# Patient Record
Sex: MALE | Race: WHITE | Employment: FULL TIME | URBAN - METROPOLITAN AREA
[De-identification: names, ages, dates, MRNs, and addresses within clinical notes are randomized per-mention and may not be internally consistent; named-entity substitution may affect disease eponyms.]

---

## 2018-06-21 NOTE — PROGRESS NOTES
SUBJECTIVE:   CC: Ramirez Alvarez is an 28 year old male who presents for preventative health visit.     Healthy Habits:    Do you get at least three servings of calcium containing foods daily (dairy, green leafy vegetables, etc.)? no    Amount of exercise or daily activities, outside of work: 7 day(s) per week    Problems taking medications regularly No    Medication side effects: No    Have you had an eye exam in the past two years? no    Do you see a dentist twice per year? yes    Do you have sleep apnea, excessive snoring or daytime drowsiness?no    Patient/Parent of patient informed that anything we discuss that is not related to preventative medicine, may be billed for; patient verbalizes understanding.    Concern(s):  1. tendonitis in both hands    Some subjective weakness. No paresthesias . Some occasional pain. No overuse . No neck or arm pain.         Today's PHQ-2 Score:   PHQ-2 ( 1999 [employer]) 6/22/2018 11/5/2015   Q1: Little interest or pleasure in doing things 0 0   Q2: Feeling down, depressed or hopeless 0 0   PHQ-2 Score 0 0       Abuse: Current or Past(Physical, Sexual or Emotional)- No  Do you feel safe in your environment - Yes    Social History   Substance Use Topics     Smoking status: Never Smoker     Smokeless tobacco: Never Used     Alcohol use Yes      Comment: occ      If you drink alcohol do you typically have >3 drinks per day or >7 drinks per week? No                      Last PSA: No results found for: PSA    Reviewed orders with patient. Reviewed health maintenance and updated orders accordingly - Yes  Labs reviewed in EPIC  BP Readings from Last 3 Encounters:   06/22/18 116/64   11/05/15 112/77   09/05/14 119/72    Wt Readings from Last 3 Encounters:   06/22/18 164 lb (74.4 kg)   11/05/15 162 lb (73.5 kg)   09/05/14 154 lb (69.9 kg)                  Patient Active Problem List   Diagnosis     CARDIOVASCULAR SCREENING; LDL GOAL LESS THAN 160     Past Surgical History:   Procedure  "Laterality Date     HC TOOTH EXTRACTION W/FORCEP  2000s       Social History   Substance Use Topics     Smoking status: Never Smoker     Smokeless tobacco: Never Used     Alcohol use Yes      Comment: occ     History reviewed. No pertinent family history.      No current outpatient prescriptions on file.     No Known Allergies  Recent Labs   Lab Test  11/05/15   0728  07/21/10   1430   LDL  107   --    HDL  58   --    TRIG  62   --    CR   --   0.92   GFRESTIMATED   --   >90   GFRESTBLACK   --   >90   POTASSIUM   --   4.1        Reviewed and updated as needed this visit by clinical staff  Tobacco  Allergies  Meds  Problems  Med Hx  Surg Hx  Fam Hx  Soc Hx          Reviewed and updated as needed this visit by Provider  Tobacco  Allergies  Meds  Problems  Med Hx  Surg Hx  Fam Hx  Soc Hx         Past Medical History:   Diagnosis Date     Recurrent acute tonsillitis [id number]    ruddy-tonsillar abcess     Seasonal allergic rhinitis       Past Surgical History:   Procedure Laterality Date     HC TOOTH EXTRACTION W/FORCEP  2000s       ROS:  CONSTITUTIONAL: NEGATIVE for fever, chills, change in weight  INTEGUMENTARY/SKIN: NEGATIVE for worrisome rashes, moles or lesions  EYES: NEGATIVE for vision changes or irritation  ENT: NEGATIVE for ear, mouth and throat problems  RESP: NEGATIVE for significant cough or SOB  CV: NEGATIVE for chest pain, palpitations or peripheral edema  GI: NEGATIVE for nausea, abdominal pain, heartburn, or change in bowel habits   male: negative for dysuria, hematuria, decreased urinary stream, erectile dysfunction, urethral discharge  MUSCULOSKELETAL: NEGATIVE for significant arthralgias or myalgia  NEURO: NEGATIVE for weakness, dizziness or paresthesias  PSYCHIATRIC: NEGATIVE for changes in mood or affect    OBJECTIVE:   /64  Pulse 63  Temp 98.1  F (36.7  C) (Oral)  Resp 16  Ht 5' 10.47\" (1.79 m)  Wt 164 lb (74.4 kg)  SpO2 99%  BMI 23.22 kg/m2  EXAM:  GENERAL: healthy, " "alert and no distress  EYES: Eyes grossly normal to inspection, PERRL and conjunctivae and sclerae normal  HENT: ear canals and TM's normal, nose and mouth without ulcers or lesions  NECK: no adenopathy, no asymmetry, masses, or scars and thyroid normal to palpation  RESP: lungs clear to auscultation - no rales, rhonchi or wheezes  CV: regular rate and rhythm, normal S1 S2, no S3 or S4, no murmur, click or rub, no peripheral edema and peripheral pulses strong  ABDOMEN: soft, nontender, no hepatosplenomegaly, no masses and bowel sounds normal  MS: no gross musculoskeletal defects noted, no edema  SKIN: no suspicious lesions or rashes  NEURO: Normal strength and tone, mentation intact and speech normal  PSYCH: mentation appears normal, affect normal/bright    ASSESSMENT/PLAN:     Ramirez was seen today for physical.    Diagnoses and all orders for this visit:    CARDIOVASCULAR SCREENING; LDL GOAL LESS THAN 160  -     Lipid panel reflex to direct LDL Fasting    Routine general medical examination at a health care facility          COUNSELING:  Reviewed preventive health counseling, as reflected in patient instructions       Regular exercise       Healthy diet/nutrition       reports that he has never smoked. He has never used smokeless tobacco.    Estimated body mass index is 23.22 kg/(m^2) as calculated from the following:    Height as of this encounter: 5' 10.47\" (1.79 m).    Weight as of this encounter: 164 lb (74.4 kg).       Counseling Resources:  ATP IV Guidelines  Pooled Cohorts Equation Calculator  FRAX Risk Assessment  ICSI Preventive Guidelines  Dietary Guidelines for Americans, 2010  USDA's MyPlate  ASA Prophylaxis  Lung CA Screening    Ramirez Nevin Sainz PA-C  [facility]  "

## 2018-06-22 ENCOUNTER — OFFICE VISIT (OUTPATIENT)
Dept: FAMILY MEDICINE | Facility: CLINIC | Age: 28
End: 2018-06-22
Payer: COMMERCIAL

## 2018-06-22 VITALS
BODY MASS INDEX: 23.48 KG/M2 | OXYGEN SATURATION: 99 % | DIASTOLIC BLOOD PRESSURE: 64 MMHG | SYSTOLIC BLOOD PRESSURE: 116 MMHG | WEIGHT: 164 LBS | TEMPERATURE: 98.1 F | HEART RATE: 63 BPM | RESPIRATION RATE: 16 BRPM | HEIGHT: 70 IN

## 2018-06-22 DIAGNOSIS — Z13.6 CARDIOVASCULAR SCREENING; LDL GOAL LESS THAN 160: Primary | ICD-10-CM

## 2018-06-22 DIAGNOSIS — Z00.00 ROUTINE GENERAL MEDICAL EXAMINATION AT A HEALTH CARE FACILITY: ICD-10-CM

## 2018-06-22 LAB
CHOLEST SERPL-MCNC: 171 MG/DL
HDLC SERPL-MCNC: 61 MG/DL
LDLC SERPL CALC-MCNC: 95 MG/DL
NONHDLC SERPL-MCNC: 110 MG/DL
TRIGL SERPL-MCNC: 74 MG/DL

## 2018-06-22 PROCEDURE — 99395 PREV VISIT EST AGE 18-39: CPT | Performed by: PHYSICIAN ASSISTANT

## 2018-06-22 PROCEDURE — 80061 LIPID PANEL: CPT | Performed by: PHYSICIAN ASSISTANT

## 2018-06-22 PROCEDURE — 36415 COLL VENOUS BLD VENIPUNCTURE: CPT | Performed by: PHYSICIAN ASSISTANT

## 2018-06-22 NOTE — MR AVS SNAPSHOT
After Visit Summary   6/22/2018    Ramirez Alvarez    MRN: [id number]           Patient Information     Date Of Birth          1990        Visit Information        Provider Department      6/22/2018 7:20 AM Ramirez Sainz PA-C [facility]        Today's Diagnoses     CARDIOVASCULAR SCREENING; LDL GOAL LESS THAN 160    -  1    Routine general medical examination at a health care facility          Care Instructions      Preventive Health Recommendations  Male Ages 26 - 39    Yearly exam:             See your health care provider every year in order to  o   Review health changes.   o   Discuss preventive care.    o   Review your medicines if your doctor has prescribed any.    You should be tested each year for STDs (sexually transmitted diseases), if you re at risk.     After age 35, talk to your provider about cholesterol testing. If you are at risk for heart disease, have your cholesterol tested at least every 5 years.     If you are at risk for diabetes, you should have a diabetes test (fasting glucose).  Shots: Get a flu shot each year. Get a tetanus shot every 10 years.     Nutrition:    Eat at least 5 servings of fruits and vegetables daily.     Eat whole-grain bread, whole-wheat pasta and brown rice instead of white grains and rice.     Talk to your provider about Calcium and Vitamin D.     Lifestyle    Exercise for at least 150 minutes a week (30 minutes a day, 5 days a week). This will help you control your weight and prevent disease.     Limit alcohol to one drink per day.     No smoking.     Wear sunscreen to prevent skin cancer.     See your dentist every six months for an exam and cleaning.             Follow-ups after your visit        Who to contact     Normal or non-critical lab and imaging results will be communicated to you by MyChart, letter or phone within 4 business days after the clinic has received the results. If you do not hear from us within 7 days, please  "contact the clinic through [employer] or phone. If you have a critical or abnormal lab result, we will notify you by phone as soon as possible.  Submit refill requests through [employer] or call your pharmacy and they will forward the refill request to us. Please allow 3 business days for your refill to be completed.          If you need to speak with a  for additional information , please call: [phone]             Additional Information About Your Visit        [employer] Information     [employer] gives you secure access to your electronic health record. If you see a primary care provider, you can also send messages to your care team and make appointments. If you have questions, please call your primary care clinic.  If you do not have a primary care provider, please call [phone] and they will assist you.        Care EveryWhere ID     This is your Care EveryWhere ID. This could be used by other organizations to access your [place] medical records  [id number]        Your Vitals Were     Pulse Temperature Respirations Height Pulse Oximetry BMI (Body Mass Index)    63 98.1  F (36.7  C) (Oral) 16 5' 10.47\" (1.79 m) 99% 23.22 kg/m2       Blood Pressure from Last 3 Encounters:   06/22/18 116/64   11/05/15 112/77   09/05/14 119/72    Weight from Last 3 Encounters:   06/22/18 164 lb (74.4 kg)   11/05/15 162 lb (73.5 kg)   09/05/14 154 lb (69.9 kg)              We Performed the Following     Lipid panel reflex to direct LDL Fasting          Today's Medication Changes          These changes are accurate as of 6/22/18  7:52 AM.  If you have any questions, ask your nurse or doctor.               Stop taking these medicines if you haven't already. Please contact your care team if you have questions.     NO ACTIVE MEDICATIONS   Stopped by:  Ramirez Sainz PA-C                    Primary Care Provider Office Phone # Fax #    [facility] [phone] [id number] " [place] [address]        Equal Access to Services     [facility] : Hadii aad ku hadpatricecarmen Medina, wahamzahda bettye, buddyboy lynnmakayla gomez. So [facility] [phone].    ATENCIÓN: Si habla español, tiene a downing disposición servicios gratuitos de asistencia lingüística. Llame al [phone].    We comply with applicable federal civil rights laws and [place] laws. We do not discriminate on the basis of race, color, national origin, age, disability, sex, sexual orientation, or gender identity.            Thank you!     Thank you for choosing [facility]  for your care. Our goal is always to provide you with excellent care. Hearing back from our patients is one way we can continue to improve our services. Please take a few minutes to complete the written survey that you may receive in the mail after your visit with us. Thank you!             Your Updated Medication List - Protect others around you: Learn how to safely use, store and throw away your medicines at www.disposemymeds.org.      Notice  As of 6/22/2018  7:52 AM    You have not been prescribed any medications.

## 2018-06-22 NOTE — NURSING NOTE
"Chief Complaint   Patient presents with     Physical       Initial /64  Pulse 63  Temp 98.1  F (36.7  C) (Oral)  Resp 16  Ht 5' 10.47\" (1.79 m)  Wt 164 lb (74.4 kg)  SpO2 99%  BMI 23.22 kg/m2 Estimated body mass index is 23.22 kg/(m^2) as calculated from the following:    Height as of this encounter: 5' 10.47\" (1.79 m).    Weight as of this encounter: 164 lb (74.4 kg).  Medication Reconciliation: complete     Elba Chacko MA  "

## 2019-02-05 ENCOUNTER — OFFICE VISIT (OUTPATIENT)
Dept: FAMILY MEDICINE | Facility: CLINIC | Age: 29
End: 2019-02-05
Payer: COMMERCIAL

## 2019-02-05 VITALS
TEMPERATURE: 98.6 F | HEART RATE: 76 BPM | BODY MASS INDEX: 23.01 KG/M2 | OXYGEN SATURATION: 97 % | DIASTOLIC BLOOD PRESSURE: 70 MMHG | WEIGHT: 160.7 LBS | SYSTOLIC BLOOD PRESSURE: 116 MMHG | HEIGHT: 70 IN | RESPIRATION RATE: 18 BRPM

## 2019-02-05 DIAGNOSIS — R07.0 THROAT PAIN: Primary | ICD-10-CM

## 2019-02-05 LAB
DEPRECATED S PYO AG THROAT QL EIA: NORMAL
SPECIMEN SOURCE: NORMAL

## 2019-02-05 PROCEDURE — 99213 OFFICE O/P EST LOW 20 MIN: CPT | Performed by: NURSE PRACTITIONER

## 2019-02-05 PROCEDURE — 87880 STREP A ASSAY W/OPTIC: CPT | Performed by: NURSE PRACTITIONER

## 2019-02-05 PROCEDURE — 87081 CULTURE SCREEN ONLY: CPT | Performed by: NURSE PRACTITIONER

## 2019-02-05 ASSESSMENT — MIFFLIN-ST. JEOR: SCORE: 1705.18

## 2019-02-05 ASSESSMENT — PAIN SCALES - GENERAL: PAINLEVEL: MILD PAIN (3)

## 2019-02-06 LAB
BACTERIA SPEC CULT: NORMAL
SPECIMEN SOURCE: NORMAL

## 2019-02-06 NOTE — PROGRESS NOTES
SUBJECTIVE:   Ramirez Alvarez is a 28 year old male who presents to clinic today for the following health issues:    Acute Illness   Acute illness concerns: Sore throat  Onset: 2 days ago    Fever: no    Chills/Sweats: no    Headache (location?): no    Sinus Pressure:no    Conjunctivitis:  no    Ear Pain: no    Rhinorrhea: no    Congestion: no    Sore Throat: yes     Cough: no    Wheeze: no    Decreased Appetite: no    Nausea: no    Vomiting: no    Diarrhea:  no    Dysuria/Freq.: no    Fatigue/Achiness: no    Sick/Strep Exposure: no     Therapies Tried and outcome: Some improvement in pain when applying pressure or massaging the left side of the throat.    Had tonsils removed eight years ago as he was getting abscesses in his throat.  Hasn't had this feeling since the tonsils were removed until now.  Reports having dull pain in the front left side of the throat, sharp pain with swallowing - no pain on the right side or in the back of the throat.  Was having a stuffed up nose, but this has improved.  More phlegm production.  Has a seven month old child that has been having intermittent illnesses that patient and his wife have been getting.  Child, seven months old and in , has been without illness for the past couple weeks.      Problem list and histories reviewed & adjusted, as indicated.  Additional history: as documented    Patient Active Problem List   Diagnosis     CARDIOVASCULAR SCREENING; LDL GOAL LESS THAN 160     Past Surgical History:   Procedure Laterality Date     HC TOOTH EXTRACTION W/FORCEP  2000s     TONSILLECTOMY, ADENOIDECTOMY ADULT, COMBINED Bilateral 2010       Social History     Tobacco Use     Smoking status: Never Smoker     Smokeless tobacco: Never Used   Substance Use Topics     Alcohol use: Yes     Comment: occ     History reviewed. No pertinent family history.      No current outpatient medications on file.     No Known Allergies  BP Readings from Last 3 Encounters:   02/05/19 116/70  "  06/22/18 116/64   11/05/15 112/77    Wt Readings from Last 3 Encounters:   02/05/19 72.9 kg (160 lb 11.2 oz)   06/22/18 74.4 kg (164 lb)   11/05/15 73.5 kg (162 lb)                    Reviewed and updated as needed this visit by clinical staff  Tobacco  Allergies  Meds  Problems  Med Hx  Surg Hx  Fam Hx  Soc Hx        Reviewed and updated as needed this visit by Provider  Tobacco  Allergies  Meds  Problems  Med Hx  Surg Hx  Fam Hx         ROS:  Constitutional, HEENT - as above, cardiovascular, pulmonary, gi and gu systems are negative, except as otherwise noted.    OBJECTIVE:     /70 (BP Location: Right arm, Patient Position: Sitting, Cuff Size: Adult Regular)   Pulse 76   Temp 98.6  F (37  C) (Oral)   Resp 18   Ht 1.778 m (5' 10\")   Wt 72.9 kg (160 lb 11.2 oz)   SpO2 97%   BMI 23.06 kg/m    Body mass index is 23.06 kg/m .  GENERAL: healthy, alert and no distress  EYES: Eyes grossly normal to inspection, PERRL and conjunctivae and sclerae normal  HENT: normal cephalic/atraumatic, bilateral clear effusions, oral mucous membranes moist and posterior oropharynx erythema with cobblestone appearance  NECK: no adenopathy, no asymmetry, masses, or scars  RESP: lungs clear to auscultation - no rales, rhonchi or wheezes  CV: regular rate and rhythm, normal S1 S2, no S3 or S4, no murmur, click or rub  PSYCH: mentation appears normal, affect normal/bright  LYMPH: no cervical or supraclavicular adenopathy    Diagnostic Test Results:  Results for orders placed or performed in visit on 02/05/19 (from the past 24 hour(s))   Strep, Rapid Screen   Result Value Ref Range    Specimen Description Throat     Rapid Strep A Screen       NEGATIVE: No Group A streptococcal antigen detected by immunoassay, await culture report.       ASSESSMENT/PLAN:     1. Throat pain  Rapid strep negative and does not present with other signs of active infection.  Left throat pain non-palpable.  Encouraged salt water gargle and " rest.  Monitor for now and if no improvement or worsening of symptoms, can trial an antibiotic.  - Strep, Rapid Screen  - Beta strep group A culture    Student nurse practitioner documented. NP agrees with the above. Patient okay with student nurse practitioner present.     FUTURE APPOINTMENTS:       - Follow-up visit in seven days or sooner if worsening of symptoms.    JULIO CESAR Addison, NP-C  [facility]

## 2019-08-08 ENCOUNTER — OFFICE VISIT (OUTPATIENT)
Dept: FAMILY MEDICINE | Facility: CLINIC | Age: 29
End: 2019-08-08
Payer: COMMERCIAL

## 2019-08-08 VITALS
RESPIRATION RATE: 16 BRPM | WEIGHT: 166.2 LBS | OXYGEN SATURATION: 97 % | BODY MASS INDEX: 23.85 KG/M2 | TEMPERATURE: 98.5 F | DIASTOLIC BLOOD PRESSURE: 76 MMHG | HEART RATE: 89 BPM | SYSTOLIC BLOOD PRESSURE: 126 MMHG

## 2019-08-08 DIAGNOSIS — M67.90 TENDINOPATHY, GENERALIZED: Primary | ICD-10-CM

## 2019-08-08 PROCEDURE — 99213 OFFICE O/P EST LOW 20 MIN: CPT | Performed by: NURSE PRACTITIONER

## 2019-08-08 RX ORDER — PREDNISONE 20 MG/1
40 TABLET ORAL DAILY
Qty: 10 TABLET | Refills: 0 | Status: SHIPPED | OUTPATIENT
Start: 2019-08-08 | End: 2019-10-04

## 2019-08-08 NOTE — PROGRESS NOTES
Subjective     Ramirez Alvarez is a 29 year old male who presents to clinic today for the following health issues:    HPI   Pain  Onset: 15 years    Description:   Location: both arms and both legs- occurs with any repetitive movements- had to drive far today- has bilateral lower leg and wrist soreness/ tendonitis.  Can't work out a gym because of the tendonitis after.     Progression of Symptoms: worse recently, improves with rest.  Not sure what stretches he should be doing before and after workout    Accompanying Signs & Symptoms:  Other symptoms: weakness of arms    Precipitating factors:   Trauma or overuse: no   Therapies Tried and outcome: advil      Patient Active Problem List   Diagnosis     CARDIOVASCULAR SCREENING; LDL GOAL LESS THAN 160     Past Surgical History:   Procedure Laterality Date     HC TOOTH EXTRACTION W/FORCEP  2000s     TONSILLECTOMY, ADENOIDECTOMY ADULT, COMBINED Bilateral 2010       Social History     Tobacco Use     Smoking status: Never Smoker     Smokeless tobacco: Never Used   Substance Use Topics     Alcohol use: Yes     Comment: occ     History reviewed. No pertinent family history.      Current Outpatient Medications   Medication Sig Dispense Refill     predniSONE (DELTASONE) 20 MG tablet Take 2 tablets (40 mg) by mouth daily for 5 days 10 tablet 0     No Known Allergies  Recent Labs   Lab Test 06/22/18  0756 11/05/15  0728   LDL 95 107   HDL 61 58   TRIG 74 62      BP Readings from Last 3 Encounters:   08/08/19 126/76   02/05/19 116/70   06/22/18 116/64    Wt Readings from Last 3 Encounters:   08/08/19 75.4 kg (166 lb 3.2 oz)   02/05/19 72.9 kg (160 lb 11.2 oz)   06/22/18 74.4 kg (164 lb)            Reviewed and updated as needed this visit by Provider  Tobacco  Allergies  Meds  Problems  Med Hx  Surg Hx  Fam Hx         Review of Systems   ROS COMP: Constitutional, HEENT, cardiovascular, pulmonary, GI, , musculoskeletal, neuro, skin, endocrine and psych systems are  negative, except as otherwise noted.      Objective    /76   Pulse 89   Temp 98.5  F (36.9  C) (Oral)   Resp 16   Wt 75.4 kg (166 lb 3.2 oz)   SpO2 97%   BMI 23.85 kg/m    Body mass index is 23.85 kg/m .     Physical Exam   GENERAL: healthy, alert and no distress  RESP: lungs clear to auscultation - no rales, rhonchi or wheezes  CV: regular rate and rhythm, normal S1 S2, no S3 or S4, no murmur, click or rub, no peripheral edema and peripheral pulses strong  MS: no gross musculoskeletal defects noted, no edema POSITIVE for pain with bilateral wrist and ankle flexion- passive and active. No edema, erythema   SKIN: no suspicious rashes  NEURO: Normal strength and tone, mentation intact and speech normal    Diagnostic Test Results:  Labs reviewed in Epic  See orders        Assessment & Plan       ICD-10-CM    1. Tendinopathy, generalized M67.90 PHYSICAL THERAPY REFERRAL     predniSONE (DELTASONE) 20 MG tablet          See Patient Instructions: discussed with patient, that I can treat his symptoms, but this will not prevent reoccurrence.  Discussed most likely cause of tendinopathies is improper body mechanics.//  Schedule with physical therapy for biomechanical assessment. Take prednisone as prescribed.  Read below info.  Follow up if symptoms persist or worsen.     Return in about 4 weeks (around 9/5/2019), or if symptoms worsen or fail to improve.    Zaina Edwards, GEOVANNA  [facility]

## 2019-08-08 NOTE — PATIENT INSTRUCTIONS
Reminders: If you are signed up for [employer] please be aware your results and communications will be sent within your [employer]. Please remember to arrive 5-10 minutes early for your appointments. If you are late you may need to reschedule your appointment.    Schedule with physical therapy for biomechanical assessment. Take prednisone as prescribed.  Read below info.  Follow up if symptoms persist or worsen.       Patient Education     Tendonitis  A tendon is the thick fibrous cord that joins muscle to bone and allows joints to move. When a tendon becomes inflamed, it is called tendonitis. This can occur from overuse, injury, or infection. This usually involves the shoulders, forearm, wrist, hands and feet. Symptoms include pain, swelling and tenderness to the touch. Moving the joint increases the pain.  It takes 4 to 6 weeks or more for tendonitis to heal. It is treated by preventing motion of the tendon, occasionally with a splint or brace, and the use of anti-inflammatory medicine.  Home care    Some people find relief with ice packs. These can be crushed or cubed ice in a plastic bag or a bag of frozen vegetables wrapped in a thin towel. Other people get better relief with heat. This can include a hot shower, hot bath, or a moist towel warmed in a microwave. Try each and use the method that feels best, for 15 to 20 minutes several times a day.    Rest the inflamed joint and protect it from movement.    You may use over-the-counter ibuprofen or naproxen to treat pain and inflammation, unless another medicine was prescribed. If you can't take these medicines, acetaminophen may help with the pain, but does not treat inflammation. If you have chronic liver or kidney disease or ever had a stomach ulcer or gastrointestinal bleeding, talk with your doctor before using these medicines.    As your symptoms improve, begin gradual motion at the involved joint.  Follow-up care  Follow up with your healthcare provider if you  are not improving after 5 to 7 days of treatment.  When to seek medical advice  Call your healthcare provider right away if any of these occur:    Redness over the painful area    Increasing pain or swelling at the joint    Fever lasting 24 to 48 hours or chills, or as advised by your healthcare provider  Date Last Reviewed: 5/1/2018 2000-2018 [employer]. [address] [place] [address]. All rights reserved. This information is not intended as a substitute for professional medical care. Always follow your healthcare professional's instructions.

## 2019-08-12 ENCOUNTER — MYC MEDICAL ADVICE (OUTPATIENT)
Dept: FAMILY MEDICINE | Facility: CLINIC | Age: 29
End: 2019-08-12

## 2019-08-12 DIAGNOSIS — M65.29: Primary | ICD-10-CM

## 2019-09-03 ENCOUNTER — THERAPY VISIT (OUTPATIENT)
Dept: OCCUPATIONAL THERAPY | Facility: CLINIC | Age: 29
End: 2019-09-03
Attending: NURSE PRACTITIONER
Payer: COMMERCIAL

## 2019-09-03 DIAGNOSIS — M25.531 PAIN IN BOTH WRISTS: Primary | ICD-10-CM

## 2019-09-03 DIAGNOSIS — M25.532 PAIN IN BOTH WRISTS: Primary | ICD-10-CM

## 2019-09-03 DIAGNOSIS — M65.29: ICD-10-CM

## 2019-09-03 PROCEDURE — 97165 OT EVAL LOW COMPLEX 30 MIN: CPT | Mod: GO | Performed by: OCCUPATIONAL THERAPIST

## 2019-09-03 PROCEDURE — 97112 NEUROMUSCULAR REEDUCATION: CPT | Mod: GO | Performed by: OCCUPATIONAL THERAPIST

## 2019-09-03 PROCEDURE — 97110 THERAPEUTIC EXERCISES: CPT | Mod: GO | Performed by: OCCUPATIONAL THERAPIST

## 2019-09-03 NOTE — PROGRESS NOTES
Hand Therapy Initial Evaluation    Current Date:  9/3/2019    Subjective:  Ramirez Alvarez is a 29 year old right hand dominant male.    Diagnosis:  Bilateral tendinopathy elbows, wrists, forearms  DOI:  8/12/19 (therapy referral)    Patient reports symptoms of pain and weakness/loss of strength of bilateral thumbs, wrists, forearms and elbows which occurred due to gradual onset over the past 10-15 years since playing tennis in high school. Since onset symptoms are gradually getting worse since July 2019.  Special tests:  none.  Previous treatment: self treatment stretching, splinting for thumbs.  General health as reported by patient is good.  Pertinent medical history includes:None  Medical allergies:none.  Surgical history: none.  Medication history: None.    Occupational Profile Information:  Current occupation is    Currently working in normal job without restrictions  Job Tasks: Computer Work, Prolonged Sitting, Prolonged Standing  Prior functional level:  no limitations  Barriers include:none  Mobility: No difficulty  Transportation: drives    Functional Outcome Measure:  Upper Extremity Functional Index  SCORE:   Column Totals: 73/80  (A lower score indicates greater disability.)    Objective:  Pain Level (Scale 0-10)   9/3/19   At Rest 0 right  0 left   With Use 4-5 right  4-5 left     Pain Description  Date 9/3/19   Location Forearm extensors, volar wrist, thenars   Pain Quality Aching, Burning and Throbbing   Frequency intermittent     Pain is worst  daytime   Exacerbated by  video noel, computer use, hand use/tasks   Relieved by Icing, rest and stretch   Progression Gradually getting worse     Edema None on exam or per patient report    Sensation  WNL throughout all nerve distributions; per patient report    ROM  WNL elbows, wrists and hands. No thenar or intrinsic atrophy noted on exam.    Strength     (Measured in pounds)  Pain Report: - none  + mild    ++ moderate    +++ severe     9/3/19 9/3/19   Trials Right Left   1  2  3 98-  96-  94- 75-  75-  72-   Average: 96 73       Elbow Ext 9/3/19 9/3/19   Trials Right Left   1 102- 84-     Lat Pinch 9/3/19 9/3/19   Trials Right Left   1  2  3 23-  23-  23- 20-  20-  18-   Average: 23 19     3 Pt Pinch 9/3/19 9/3/19   Trials Right Left   1  2  3 23-  23-  21- 16-  17-  17-   Average: 22 17     Resisted Testing  Pain Report: - none  + mild    ++ moderate    +++ severe    9/3/19   Elbow ext R:-  L:-   Elbow flex R:-  L:-   Pronation R:-  L:-   Supination R:-  L:-   Wrist Ext with RD R:-  L:-   Wrist Ext with UD R:-  L:-   Wrist Flex with RD R:-  L:-   Wrist Flex with UD R:-  L:-   FDS R:-  L:-   EDC R:-  L:-   EPB R:-  L:-   APL R:-  L:-   APB R:-  L:-     Special Tests   9/3/19   ULTT Median nerve R:++ stretch end range  L:+ stretch end range   ULTT Radial nerve R:++ stretch end range  L:+ stretch end range     Palpation   9/3/19   LEP R:+ slight  L:+ slight   Extensors R:+  L:+   MEP R:-  L:-   Flexors R:-  L:-   Dorsal wrist R:-  L:-   Volar wrist R:+  L:+ slight   Radial wrist R:-  L:+ slight   Ulnar wrist R:-  L:-   Thenars R:++  L:++     Assessment:  Patient presents with symptoms consistent with diagnosis of elbow, forearm, wrist and thumb pain, with conservative intervention.     Patient's limitations or Problem List includes:  Pain and Tightness in musculature of bilateral elbows, forearms, wrists and thumbs which interferes with the patient's ability to perform Work Tasks, Recreational Activities, Household Chores and Driving  as compared to previous level of function.    Rehab Potential:  Excellent - Return to full activity, no limitations    Patient will benefit from skilled Occupational Therapy to increase flexibility and posture and decrease pain to return to previous activity level and resume normal daily tasks and to reach their rehab potential.    Barriers to Learning:  No barrier    Communication Issues:  Patient appears to be  able to clearly communicate and understand verbal and written communication and follow directions correctly.    Chart Review: Chart Review and Simple history review with patient    Identified Performance Deficits: driving and community mobility, home establishment and management, work and leisure activities    Assessment of Occupational Performance:  5 or more Performance Deficits    Clinical Decision Making (Complexity): Low complexity    Treatment Explanation:  The following has been discussed with the patient:  RX ordered/plan of care  Anticipated outcomes  Possible risks and side effects    Plan:  Frequency:  2 X a month, once daily  Duration:  for 2 months  Treatment Plan:    Modalities:  US  Therapeutic Exercise:  AROM, PROM, Tendon Gliding, Isotonics and Isometrics  Neuromuscular re-education:  Nerve Gliding, Posture and Kinesiotaping  Manual Techniques:  Myofascial release  Self Care:  Self Care Tasks and Ergonomic Considerations    Discharge Plan:  Achieve all LTG.  Independent in home treatment program.  Reach maximal therapeutic benefit.    Home Exercise Program:  Warmth for stiffness  Ice after activity for pain  MFR to forearm extensors and flexors and thenar muscles with ball  Forearm E/F stretches, advanced stretches as anthony  Proximal median nerve gliding for myofascial flexor stretch  Proximal radial nerve gliding for myofascial extensor stretch  Postural awareness, avoid rounded shoulders and forward head  Work breaks at computer 30-60 seconds every 30-60 minutes     Next Visit:  See in 2 weeks  Assess response to HEP  Progress to pec stretches and postural exercises  Education regarding healthy work habits and computer ergonomics

## 2019-09-23 ENCOUNTER — THERAPY VISIT (OUTPATIENT)
Dept: OCCUPATIONAL THERAPY | Facility: CLINIC | Age: 29
End: 2019-09-23
Payer: COMMERCIAL

## 2019-09-23 DIAGNOSIS — M25.532 PAIN IN BOTH WRISTS: Primary | ICD-10-CM

## 2019-09-23 DIAGNOSIS — M25.531 PAIN IN BOTH WRISTS: Primary | ICD-10-CM

## 2019-09-23 DIAGNOSIS — M65.29: ICD-10-CM

## 2019-09-23 PROCEDURE — 97110 THERAPEUTIC EXERCISES: CPT | Mod: GO | Performed by: OCCUPATIONAL THERAPIST

## 2019-09-23 PROCEDURE — 97140 MANUAL THERAPY 1/> REGIONS: CPT | Mod: GO | Performed by: OCCUPATIONAL THERAPIST

## 2019-10-04 ENCOUNTER — OFFICE VISIT (OUTPATIENT)
Dept: FAMILY MEDICINE | Facility: CLINIC | Age: 29
End: 2019-10-04
Payer: COMMERCIAL

## 2019-10-04 VITALS
TEMPERATURE: 97.2 F | SYSTOLIC BLOOD PRESSURE: 126 MMHG | RESPIRATION RATE: 20 BRPM | BODY MASS INDEX: 23.33 KG/M2 | WEIGHT: 162.6 LBS | DIASTOLIC BLOOD PRESSURE: 82 MMHG | HEART RATE: 75 BPM | OXYGEN SATURATION: 97 %

## 2019-10-04 DIAGNOSIS — M79.661 PAIN IN RIGHT SHIN: Primary | ICD-10-CM

## 2019-10-04 PROCEDURE — 99213 OFFICE O/P EST LOW 20 MIN: CPT | Performed by: PHYSICIAN ASSISTANT

## 2019-10-04 RX ORDER — NAPROXEN 500 MG/1
500 TABLET ORAL 2 TIMES DAILY WITH MEALS
Qty: 30 TABLET | Refills: 1 | Status: SHIPPED | OUTPATIENT
Start: 2019-10-04

## 2019-10-04 NOTE — PROGRESS NOTES
Subjective     Ramirez Alvarez is a 29 year old male who presents to clinic today for the following health issues:    HPI   Joint Pain    Onset: 1-1.5 months    Description:   Location: right ankle  Character: Dull ache    Intensity: moderate    Progression of Symptoms: worse    Accompanying Signs & Symptoms:  Other symptoms: throbbing feeling at times    History:   Previous similar pain: no       Precipitating factors:   Trauma or overuse: no     Alleviating factors:  Improved by: nothing    Therapies Tried and outcome: ice and stretching, compression socks    No low back pain. Pain left leg when sitting and driving. A dull pain. No injury. No swelling.    Symptoms x 2.5 mos . No numbness or tingling or weakness.       Reviewed and updated as needed this visit by Provider         Review of Systems   ROS COMP: Constitutional, HEENT, cardiovascular, pulmonary, GI, , musculoskeletal, neuro, skin, endocrine and psych systems are negative, except as otherwise noted.      Objective    /82   Pulse 75   Temp 97.2  F (36.2  C) (Tympanic)   Resp 20   Wt 73.8 kg (162 lb 9.6 oz)   SpO2 97%   BMI 23.33 kg/m    Body mass index is 23.33 kg/m .  Physical Exam   Some tenderness involving his anterior leg . Some pain with dorsi and plantar flexion of his foot. Ankle and foot rom otherwise normal. Cms intact.   Lumbosacral spine area reveals no local tenderness or mass.  Full and painless lumbosacral range of motion is noted. Straight leg raise is negative at 90 degrees on both sides. DTR's, motor strength and sensation normal, including heel and toe gait.  Peripheral pulses are palpable. Hips and knees have full range of motion without pain. No abdominal tenderness, mass or organomegaly.    Ramirez was seen today for musculoskeletal problem.    Diagnoses and all orders for this visit:    Pain in right shin  -     naproxen (NAPROSYN) 500 MG tablet; Take 1 tablet (500 mg) by mouth 2 times daily (with meals)  -     [facility] REFERR[facility]; Future      Advised supportive and symptomatic treatment.  Follow up with Provider - if condition persists or worsens.

## 2019-10-24 NOTE — PROGRESS NOTES
"[facility] Initial Evaluation  Subjective:  The history is provided by the patient. No  was used.   Ramirez Alvarez being seen for Shin pain.   Problem began 7/17/2019. Where condition occurred: at work.Problem occurred: Car foot pedal, normal walking  and reported as 2/10 on pain scale. General health as reported by patient is good. Pertinent medical history includes:  None.    Surgeries include:  None.  Current medications:  None.   Primary job tasks include:  Computer work.  Pain is described as aching and is constant. Pain is the same all the time. Since onset symptoms are unchanged. Imaging testing: N/A. Previous treatment includes other. There was moderate improvement following previous treatment.   Patient is . Restrictions include:  Working in normal job without restrictions.    Barriers include:  None as reported by patient.  Red flags:  None as reported by patient.  Type of problem:  Right ankle   Condition occurred with:  Insidious onset. This is a chronic condition   Problem details: Pt reports pain seemed to start in R foot earlier this summer.  Foot pain seemed to go away but lower leg still seems problematic.  Referred to PT 10/04/2019.   Site of Pain: R lateral lower leg.   Exacerbated by: walking, gas pedal and brake pedal in car. Relieved by: stretching (pt demonstrates PF / inversion), rubbing it.    Pt states his goal is that it will \"not flare up as much.\"              Objective:  System    Ankle/Foot Evaluation  ROM:  PROM is normal.      Strength:    Dorsiflexion:  Left: 5/5      Pain:-   Right: 5/5    Pain:-  Plantarflexion: Left: 5/5    Pain:-   Right: 5/5   Pain:-  Inversion:Left: 5/5   Pain:-     Right: 5/5   Pain:-/+  Eversion:Left: 5/5   Pain:-  Right: 5/5   Pain:-/+                      PALPATION: Palpation of ankle: tender to palpation R lateral lower leg along path of peroneals proximal to lateral malleolus.             Lumbar/SI " Evaluation    Lumbar Myotomes:    T12-L3 (Hip Flex):  Left: 5    Right: 5  L2-4 (Quads):  Left:  5    Right:  5            Lumbar Dermtomes:  normal                  Lumbar Palpation:  normal                                                           Shana Lumbar Evaluation    Posture:  Sitting: fair  Standing: fair  Lordosis: Reduced  Lateral Shift: no  Correction of Posture: no effect    Movement Loss:  Flexion (Flex): nil and pain  Extension (EXT): nil  Side Glide R (SG R): nil  Side Glide L (SG L): nil and pain  Test Movements:  FIS: During: increases  After: no worse  Pretest Movements: R lateral lower leg  Repeat FIS: During: increases  After: no worse    EIS: During: decreases  After: no better    Repeat EIS: During: decreases  After: no better      EIL: During: decreases  After: better    Repeat EIL: During: abolishes          Conclusion: derangement  Principle of Treatment:      Extension: REIL                                           ROS    Assessment/Plan:    Patient is a 29 year old male with right lower leg complaints.    Patient has the following significant findings with corresponding treatment plan.                Diagnosis 1:  R lateral lower leg pain, abolished after lumbar extension  Pain -  hot/cold therapy and directional preference exercise  Impaired muscle performance - neuro re-education  Decreased function - therapeutic activities  Impaired posture - neuro re-education    Therapy Evaluation Codes:   1) History comprised of:   Personal factors that impact the plan of care:      Overall behavior pattern and Time since onset of symptoms.    Comorbidity factors that impact the plan of care are:      None.     Medications impacting care: None.  2) Examination of Body Systems comprised of:   Body structures and functions that impact the plan of care:      Ankle and Lumbar spine.   Activity limitations that impact the plan of care are:      Walking.  3) Clinical presentation characteristics  are:   Unstable/Unpredictable.  4) Decision-Making    Low complexity using standardized patient assessment instrument and/or measureable assessment of functional outcome.  Cumulative Therapy Evaluation is: Low complexity.    Previous and current functional limitations:  (See Goal Flow Sheet for this information)    Short term and Long term goals: (See Goal Flow Sheet for this information)     Communication ability:  Patient appears to be able to clearly communicate and understand verbal and written communication and follow directions correctly.  Treatment Explanation - The following has been discussed with the patient:   RX ordered/plan of care  Anticipated outcomes  Possible risks and side effects  This patient would benefit from PT intervention to resume normal activities.   Rehab potential is good.    Frequency:  1 X week, once daily  Duration:  for 4 weeks  Discharge Plan:  Achieve all LTG.  Independent in home treatment program.  Reach maximal therapeutic benefit.    Please refer to the daily flowsheet for treatment today, total treatment time and time spent performing 1:1 timed codes.

## 2019-10-25 ENCOUNTER — THERAPY VISIT (OUTPATIENT)
Dept: PHYSICAL THERAPY | Facility: CLINIC | Age: 29
End: 2019-10-25
Payer: COMMERCIAL

## 2019-10-25 DIAGNOSIS — M79.661 PAIN OF RIGHT LOWER LEG: ICD-10-CM

## 2019-10-25 DIAGNOSIS — M79.661 PAIN IN RIGHT SHIN: ICD-10-CM

## 2019-10-25 PROCEDURE — 97161 PT EVAL LOW COMPLEX 20 MIN: CPT | Mod: GP | Performed by: PHYSICAL THERAPIST

## 2019-10-25 PROCEDURE — 97110 THERAPEUTIC EXERCISES: CPT | Mod: GP | Performed by: PHYSICAL THERAPIST

## 2019-11-04 ENCOUNTER — THERAPY VISIT (OUTPATIENT)
Dept: PHYSICAL THERAPY | Facility: CLINIC | Age: 29
End: 2019-11-04
Payer: COMMERCIAL

## 2019-11-04 DIAGNOSIS — M79.661 PAIN OF RIGHT LOWER LEG: ICD-10-CM

## 2019-11-04 PROCEDURE — 97112 NEUROMUSCULAR REEDUCATION: CPT | Mod: GP | Performed by: PHYSICAL THERAPIST

## 2019-11-04 PROCEDURE — 97110 THERAPEUTIC EXERCISES: CPT | Mod: GP | Performed by: PHYSICAL THERAPIST

## 2019-11-07 ENCOUNTER — HEALTH MAINTENANCE LETTER (OUTPATIENT)
Age: 29
End: 2019-11-07

## 2019-11-15 ENCOUNTER — THERAPY VISIT (OUTPATIENT)
Dept: PHYSICAL THERAPY | Facility: CLINIC | Age: 29
End: 2019-11-15
Payer: COMMERCIAL

## 2019-11-15 DIAGNOSIS — M79.661 PAIN OF RIGHT LOWER LEG: ICD-10-CM

## 2019-11-15 PROCEDURE — 97110 THERAPEUTIC EXERCISES: CPT | Mod: GP | Performed by: PHYSICAL THERAPIST

## 2019-11-15 NOTE — PROGRESS NOTES
"Subjective:  HPI                    Objective:  System    Physical Exam    General     ROS    Assessment/Plan:    SUBJECTIVE  Subjective: \"It helps but it's still there.  It hasn't been really bad.\"  Finds the HEP is helpful and quickly reduces the pain but \"it hasn't gone away completely.\"   Current Pain level: (not rated numerically)   Changes in function:  Yes (See Goal flowsheet attached for changes in current functional level)     Adverse reaction to treatment or activity:  None    OBJECTIVE  Objective: No distal strength asymmetry.  Tender to palpation diffusely R lateral lower leg but reduced after extension overpressure.     ASSESSMENT  Ramirez continues to require intervention to meet STG and LTG's: PT  Patient is progressing as expected.  Response to therapy has shown an improvement in  function  Progress made towards STG/LTG?  Yes (See Goal flowsheet attached for updates on achievement of STG and LTG)    PLAN  Pt continues to note relief with extension progression.  Considered discharge to [facility] but elected to schedule appt for 12/23 to ensure continued progress given proximal approach.  Return sooner if plateaus or worsens.    PTA/ATC plan:  N/A    Please refer to the daily flowsheet for treatment today, total treatment time and time spent performing 1:1 timed codes.        "

## 2019-11-25 PROBLEM — M25.532 PAIN IN BOTH WRISTS: Status: RESOLVED | Noted: 2019-09-03 | Resolved: 2019-11-25

## 2019-11-25 PROBLEM — M25.531 PAIN IN BOTH WRISTS: Status: RESOLVED | Noted: 2019-09-03 | Resolved: 2019-11-25

## 2019-11-25 PROBLEM — M65.29: Status: RESOLVED | Noted: 2019-09-03 | Resolved: 2019-11-25

## 2019-12-23 ENCOUNTER — THERAPY VISIT (OUTPATIENT)
Dept: PHYSICAL THERAPY | Facility: CLINIC | Age: 29
End: 2019-12-23
Payer: COMMERCIAL

## 2019-12-23 DIAGNOSIS — M79.661 PAIN OF RIGHT LOWER LEG: ICD-10-CM

## 2019-12-23 PROCEDURE — 97110 THERAPEUTIC EXERCISES: CPT | Mod: GP | Performed by: PHYSICAL THERAPIST

## 2019-12-23 NOTE — PROGRESS NOTES
Subjective:  HPI                    Objective:  System    Physical Exam    General     ROS    Assessment/Plan:    PROGRESS  REPORT    Progress reporting period is from 10/25/2019 to 12/23/2019.       SUBJECTIVE  Subjective: Pt reports he definitely is better than when PT began but is concerned he is plateauing.  Can still feel it during traffic but needs to be longer drive to create symptoms than it used to be and goes away quicker than it used to.    Current Pain level: 0/10.     Initial Pain level: 2/10.   Changes in function:  Yes (See Goal flowsheet attached for changes in current functional level)  Adverse reaction to treatment or activity: None    OBJECTIVE  Objective: No distal strength asymmetry.  Positive SLR R but not L.  Reduced SG L compared to R and pt noted was more uncomfortable to L.     ASSESSMENT/PLAN  Updated problem list and treatment plan: Diagnosis 1:  R lower leg pain -- see plan below  STG/LTGs have been met or progress has been made towards goals:  Yes (See Goal flow sheet completed today.)  Assessment of Progress: The patient's condition has potential to improve.  Self Management Plans:  Patient is independent in a home treatment program.  I have re-evaluated this patient and find that the nature, scope, duration and intensity of the therapy is appropriate for the medical condition of the patient.  Ramirez continues to require the following intervention to meet STG and LTG's: see plan below    Recommendations:  Good response to addition of lateral component in clinic today.  Pt does not have further PT scheduled at this point.  Return if not progressing or if there are further issues.  Otherwise will consider him discharged if I haven't heard from him in a month.    Please refer to the daily flowsheet for treatment today, total treatment time and time spent performing 1:1 timed codes.

## 2020-01-23 PROBLEM — M79.661 PAIN OF RIGHT LOWER LEG: Status: RESOLVED | Noted: 2019-10-25 | Resolved: 2020-01-23

## 2020-11-29 ENCOUNTER — HEALTH MAINTENANCE LETTER (OUTPATIENT)
Age: 30
End: 2020-11-29

## 2021-09-25 ENCOUNTER — HEALTH MAINTENANCE LETTER (OUTPATIENT)
Age: 31
End: 2021-09-25

## 2022-01-15 ENCOUNTER — HEALTH MAINTENANCE LETTER (OUTPATIENT)
Age: 32
End: 2022-01-15

## 2022-12-26 ENCOUNTER — HEALTH MAINTENANCE LETTER (OUTPATIENT)
Age: 32
End: 2022-12-26

## 2023-04-16 ENCOUNTER — HEALTH MAINTENANCE LETTER (OUTPATIENT)
Age: 33
End: 2023-04-16
